# Patient Record
Sex: FEMALE | URBAN - METROPOLITAN AREA
[De-identification: names, ages, dates, MRNs, and addresses within clinical notes are randomized per-mention and may not be internally consistent; named-entity substitution may affect disease eponyms.]

---

## 2022-07-29 ENCOUNTER — OFFICE VISIT (OUTPATIENT)
Dept: URBAN - METROPOLITAN AREA CLINIC 96 | Facility: CLINIC | Age: 32
End: 2022-07-29
Payer: COMMERCIAL

## 2022-07-29 ENCOUNTER — DASHBOARD ENCOUNTERS (OUTPATIENT)
Age: 32
End: 2022-07-29

## 2022-07-29 ENCOUNTER — LAB OUTSIDE AN ENCOUNTER (OUTPATIENT)
Dept: URBAN - METROPOLITAN AREA CLINIC 96 | Facility: CLINIC | Age: 32
End: 2022-07-29

## 2022-07-29 ENCOUNTER — WEB ENCOUNTER (OUTPATIENT)
Dept: URBAN - METROPOLITAN AREA CLINIC 96 | Facility: CLINIC | Age: 32
End: 2022-07-29

## 2022-07-29 VITALS — WEIGHT: 185 LBS | TEMPERATURE: 96.3 F | BODY MASS INDEX: 29.03 KG/M2 | HEIGHT: 67 IN

## 2022-07-29 DIAGNOSIS — K83.8 DILATED BILE DUCT: ICD-10-CM

## 2022-07-29 DIAGNOSIS — R10.11 RIGHT UPPER QUADRANT ABDOMINAL PAIN: ICD-10-CM

## 2022-07-29 DIAGNOSIS — R74.8 ABNORMAL LIVER ENZYMES: ICD-10-CM

## 2022-07-29 DIAGNOSIS — R11.0 NAUSEA: ICD-10-CM

## 2022-07-29 DIAGNOSIS — K80.21 CALCULUS OF GALLBLADDER WITH BILIARY OBSTRUCTION BUT WITHOUT CHOLECYSTITIS: ICD-10-CM

## 2022-07-29 PROBLEM — 77528005: Status: ACTIVE | Noted: 2022-07-29

## 2022-07-29 PROCEDURE — 99204 OFFICE O/P NEW MOD 45 MIN: CPT

## 2022-07-29 RX ORDER — DICYCLOMINE HYDROCHLORIDE 10 MG/1
1 CAPSULE CAPSULE ORAL
Qty: 90 | Refills: 0 | OUTPATIENT
Start: 2022-07-29 | End: 2022-08-28

## 2022-07-29 RX ORDER — ONDANSETRON 4 MG/1
1 TABLET ON THE TONGUE AND ALLOW TO DISSOLVE TABLET, ORALLY DISINTEGRATING ORAL
Qty: 15 | Refills: 1 | OUTPATIENT
Start: 2022-07-29

## 2022-07-29 NOTE — HPI-TODAY'S VISIT:
Patient is a 31 year old female who presents to follow up on recent ER visit She went to the ER 2 weeks ago on 7/15/2022 for abdominal pain in the RUQ CT scan and US completed- Cholelithiasis and dilated cystic duct. Mild dilated common bile duct, Hepatomegaly  US of the gallbladder found cholelithiasis, mild dilation of the common bile duct  Labs revealed normal WBC count, ALT of 507 and , alk phos 207, total bilirubin 2.3  It was recommended an MRCP/ERCP be completed- patient left AMA due to  she had to attend  She feels she noticed scleral icterus last night which has resolved today- she called Jackie on call and they advised her to wait until appointment to be examined Denies pain in the office but does feel abdominal discomfort  Having mild nausea with decreased appetitie Denies vomiting episodes  Denies fever or chills BMs are described as normal- has diarrhea prior to onset of abdominal pain prior to ER visit Denies BRBPR or melena

## 2022-07-29 NOTE — PHYSICAL EXAM CHEST:
Dr. Mcghee at bedside.   chest wall non-tender, breathing is unlabored without accessory muscle use, normal breath sounds

## 2022-07-30 LAB
A/G RATIO: 1.6
ABSOLUTE BASOPHILS: 67
ABSOLUTE EOSINOPHILS: 155
ABSOLUTE LYMPHOCYTES: 1288
ABSOLUTE MONOCYTES: 899
ABSOLUTE NEUTROPHILS: 8691
ALBUMIN: 4.3
ALKALINE PHOSPHATASE: 449
ALT (SGPT): 511
AST (SGOT): 302
BASOPHILS: 0.6
BILIRUBIN, TOTAL: 11.8
BUN/CREATININE RATIO: (no result)
BUN: 10
CALCIUM: 9.6
CARBON DIOXIDE, TOTAL: 23
CHLORIDE: 99
CREATININE: 0.71
EGFR: 115
EOSINOPHILS: 1.4
GGT: 403
GLOBULIN, TOTAL: 2.7
GLUCOSE: 99
HEMATOCRIT: 39.8
HEMOGLOBIN: 13.5
LYMPHOCYTES: 11.6
MCH: 30.5
MCHC: 33.9
MCV: 89.8
MONOCYTES: 8.1
MPV: 11
NEUTROPHILS: 78.3
PLATELET COUNT: 393
POTASSIUM: 4.4
PROTEIN, TOTAL: 7
RDW: 12.9
RED BLOOD CELL COUNT: 4.43
SODIUM: 134
WHITE BLOOD CELL COUNT: 11.1

## 2022-08-01 ENCOUNTER — TELEPHONE ENCOUNTER (OUTPATIENT)
Dept: URBAN - METROPOLITAN AREA CLINIC 96 | Facility: CLINIC | Age: 32
End: 2022-08-01

## 2022-08-01 ENCOUNTER — WEB ENCOUNTER (OUTPATIENT)
Dept: URBAN - METROPOLITAN AREA CLINIC 96 | Facility: CLINIC | Age: 32
End: 2022-08-01

## 2022-08-02 ENCOUNTER — TELEPHONE ENCOUNTER (OUTPATIENT)
Dept: URBAN - METROPOLITAN AREA CLINIC 96 | Facility: CLINIC | Age: 32
End: 2022-08-02

## 2022-08-02 ENCOUNTER — WEB ENCOUNTER (OUTPATIENT)
Dept: URBAN - METROPOLITAN AREA CLINIC 96 | Facility: CLINIC | Age: 32
End: 2022-08-02

## 2022-08-02 ENCOUNTER — TELEPHONE ENCOUNTER (OUTPATIENT)
Dept: URBAN - METROPOLITAN AREA CLINIC 92 | Facility: CLINIC | Age: 32
End: 2022-08-02

## 2022-08-03 ENCOUNTER — WEB ENCOUNTER (OUTPATIENT)
Dept: URBAN - METROPOLITAN AREA CLINIC 96 | Facility: CLINIC | Age: 32
End: 2022-08-03

## 2022-08-11 ENCOUNTER — TELEPHONE ENCOUNTER (OUTPATIENT)
Dept: URBAN - METROPOLITAN AREA CLINIC 96 | Facility: CLINIC | Age: 32
End: 2022-08-11

## 2022-08-12 ENCOUNTER — WEB ENCOUNTER (OUTPATIENT)
Dept: URBAN - METROPOLITAN AREA CLINIC 96 | Facility: CLINIC | Age: 32
End: 2022-08-12

## 2022-08-15 ENCOUNTER — ERX REFILL RESPONSE (OUTPATIENT)
Dept: URBAN - METROPOLITAN AREA CLINIC 98 | Facility: CLINIC | Age: 32
End: 2022-08-15

## 2022-08-15 RX ORDER — DICYCLOMINE HYDROCHLORIDE 10 MG/1
1 CAPSULE CAPSULE ORAL
Qty: 90 | Refills: 0 | OUTPATIENT

## 2022-08-15 RX ORDER — DICYCLOMINE HYDROCHLORIDE 10 MG/1
TAKE 1 CAPSULE BY MOUTH THREE TIMES A DAY AS NEEDED FOR 30 DAYS CAPSULE ORAL
Qty: 270 CAPSULE | Refills: 1 | OUTPATIENT

## 2022-08-16 ENCOUNTER — WEB ENCOUNTER (OUTPATIENT)
Dept: URBAN - METROPOLITAN AREA CLINIC 96 | Facility: CLINIC | Age: 32
End: 2022-08-16

## 2022-08-18 PROBLEM — 123608004: Status: ACTIVE | Noted: 2022-07-29
